# Patient Record
Sex: FEMALE | Race: BLACK OR AFRICAN AMERICAN | NOT HISPANIC OR LATINO | URBAN - METROPOLITAN AREA
[De-identification: names, ages, dates, MRNs, and addresses within clinical notes are randomized per-mention and may not be internally consistent; named-entity substitution may affect disease eponyms.]

---

## 2019-05-06 ENCOUNTER — APPOINTMENT (RX ONLY)
Dept: URBAN - METROPOLITAN AREA CLINIC 45 | Facility: CLINIC | Age: 59
Setting detail: DERMATOLOGY
End: 2019-05-06

## 2019-05-06 ENCOUNTER — APPOINTMENT (RX ONLY)
Dept: URBAN - METROPOLITAN AREA CLINIC 44 | Facility: CLINIC | Age: 59
Setting detail: DERMATOLOGY
End: 2019-05-06

## 2019-05-06 DIAGNOSIS — T07XXXA INSECT BITE, NONVENOMOUS, OF OTHER, MULTIPLE, AND UNSPECIFIED SITES, WITHOUT MENTION OF INFECTION: ICD-10-CM

## 2019-05-06 DIAGNOSIS — L50.3 DERMATOGRAPHIC URTICARIA: ICD-10-CM

## 2019-05-06 DIAGNOSIS — L30.9 DERMATITIS, UNSPECIFIED: ICD-10-CM

## 2019-05-06 PROBLEM — J30.1 ALLERGIC RHINITIS DUE TO POLLEN: Status: ACTIVE | Noted: 2019-05-06

## 2019-05-06 PROBLEM — F41.9 ANXIETY DISORDER, UNSPECIFIED: Status: ACTIVE | Noted: 2019-05-06

## 2019-05-06 PROBLEM — S50.861A INSECT BITE (NONVENOMOUS) OF RIGHT FOREARM, INITIAL ENCOUNTER: Status: ACTIVE | Noted: 2019-05-06

## 2019-05-06 PROBLEM — S40.861A INSECT BITE (NONVENOMOUS) OF RIGHT UPPER ARM, INITIAL ENCOUNTER: Status: ACTIVE | Noted: 2019-05-06

## 2019-05-06 PROBLEM — L70.0 ACNE VULGARIS: Status: ACTIVE | Noted: 2019-05-06

## 2019-05-06 PROBLEM — M12.9 ARTHROPATHY, UNSPECIFIED: Status: ACTIVE | Noted: 2019-05-06

## 2019-05-06 PROCEDURE — ? COUNSELING

## 2019-05-06 PROCEDURE — 11104 PUNCH BX SKIN SINGLE LESION: CPT

## 2019-05-06 PROCEDURE — ? ADDITIONAL NOTES

## 2019-05-06 PROCEDURE — ? BIOPSY BY PUNCH METHOD

## 2019-05-06 PROCEDURE — 99213 OFFICE O/P EST LOW 20 MIN: CPT | Mod: 25

## 2019-05-06 PROCEDURE — ? TREATMENT REGIMEN

## 2019-05-06 ASSESSMENT — LOCATION ZONE DERM
LOCATION ZONE: TRUNK
LOCATION ZONE: TRUNK
LOCATION ZONE: ARM
LOCATION ZONE: NECK
LOCATION ZONE: ARM
LOCATION ZONE: NECK

## 2019-05-06 ASSESSMENT — LOCATION DETAILED DESCRIPTION DERM
LOCATION DETAILED: LEFT MEDIAL TRAPEZIAL NECK
LOCATION DETAILED: RIGHT PROXIMAL DORSAL FOREARM
LOCATION DETAILED: RIGHT PROXIMAL POSTERIOR UPPER ARM
LOCATION DETAILED: RIGHT PROXIMAL DORSAL FOREARM
LOCATION DETAILED: LEFT PROXIMAL RADIAL DORSAL FOREARM
LOCATION DETAILED: LEFT SUPERIOR MEDIAL UPPER BACK
LOCATION DETAILED: LEFT PROXIMAL POSTERIOR UPPER ARM
LOCATION DETAILED: RIGHT PROXIMAL POSTERIOR UPPER ARM
LOCATION DETAILED: LEFT PROXIMAL POSTERIOR UPPER ARM
LOCATION DETAILED: LEFT SUPERIOR MEDIAL UPPER BACK
LOCATION DETAILED: LEFT MEDIAL TRAPEZIAL NECK
LOCATION DETAILED: LEFT PROXIMAL RADIAL DORSAL FOREARM

## 2019-05-06 ASSESSMENT — LOCATION SIMPLE DESCRIPTION DERM
LOCATION SIMPLE: LEFT POSTERIOR UPPER ARM
LOCATION SIMPLE: LEFT POSTERIOR UPPER ARM
LOCATION SIMPLE: LEFT FOREARM
LOCATION SIMPLE: POSTERIOR NECK
LOCATION SIMPLE: RIGHT POSTERIOR UPPER ARM
LOCATION SIMPLE: LEFT UPPER BACK
LOCATION SIMPLE: LEFT FOREARM
LOCATION SIMPLE: LEFT UPPER BACK
LOCATION SIMPLE: POSTERIOR NECK
LOCATION SIMPLE: RIGHT FOREARM
LOCATION SIMPLE: RIGHT FOREARM
LOCATION SIMPLE: RIGHT POSTERIOR UPPER ARM

## 2019-05-06 NOTE — PROCEDURE: TREATMENT REGIMEN
Otc Regimen: Loratadine 10 mg twice daily am \\nDerMend Anti itch lotion\\nBenadryl qhs
Detail Level: Simple
Plan: Advised since there has been no improvement over one year. Advised to consider punch bx for definite dx. Pt agrees on bx.  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  She does take several medications and she discontinued several more recently (Lexapro, Xanax, Ambien) thinking they were possible culprits for her rash.

## 2019-05-06 NOTE — PROCEDURE: BIOPSY BY PUNCH METHOD
Punch Size In Mm: 4
Billing Type: Third-Party Bill
Biopsy Type: H and E
X Depth Of Punch In Cm (Optional): 0
Render Post-Care Instructions In Note?: no
Suture Removal: 14 days
Anesthesia Type: 1% lidocaine without epinephrine
Epidermal Sutures: 4-0 Nylon
Dressing: bandage
Was A Bandage Applied: Yes
Detail Level: Detailed
Wound Care: Petrolatum
Hemostasis: None
Anesthesia Volume In Cc: 0.5

## 2019-05-06 NOTE — PROCEDURE: ADDITIONAL NOTES
Additional Notes: I suspect lesions on the right upper extremity are insect bites; patient does report being at a [employer] yesterday and lesions appeared within the last 24 hours.  Reassured patient; monitor for persistent lesions
Detail Level: Simple

## 2019-05-06 NOTE — HPI: RASH
What Type Of Note Output Would You Prefer (Optional)?: Bullet Format
How Severe Is Your Rash?: mild
Is This A New Presentation, Or A Follow-Up?: Rash
Additional History: Patient states she has not used anything topical. Pt also states she went to [facility] and was referred to [facility] for dermatitis. Pt states she has taken hydroxyzine in the past but medication made her dizzy.

## 2019-05-06 NOTE — PROCEDURE: ADDITIONAL NOTES
Detail Level: Simple
Additional Notes: I suspect lesions on the right upper extremity are insect bites; patient does report being at a [employer] yesterday and lesions appeared within the last 24 hours.  Reassured patient; monitor for persistent lesions

## 2019-05-06 NOTE — HPI: RASH
What Type Of Note Output Would You Prefer (Optional)?: Bullet Format
How Severe Is Your Rash?: mild
Is This A New Presentation, Or A Follow-Up?: Rash
Additional History: Patient states she has not used anything topical. Pt also states she went to [facility] and was referred to [facility] for dermatitis. Pt states she has taken hydroxyzine in the past but medication made her dizzy.

## 2019-05-06 NOTE — PROCEDURE: TREATMENT REGIMEN
Detail Level: Simple
Plan: Advised since there has been no improvement over one year. Advised to consider punch bx for definite dx. Pt agrees on bx.  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  She does take several medications and she discontinued several more recently (Lexapro, Xanax, Ambien) thinking they were possible culprits for her rash.
Otc Regimen: Loratadine 10 mg twice daily am \\nDerMend Anti itch lotion\\nBenadryl qhs

## 2019-05-06 NOTE — PROCEDURE: BIOPSY BY PUNCH METHOD
Lab Facility: [id number]
Dressing: bandage
Anesthesia Type: 1% lidocaine without epinephrine
Render Post-Care Instructions In Note?: no
Billing Type: Third-Party Bill
Size Of Lesion In Cm (Optional): 0
Suture Removal: 14 days
Anesthesia Volume In Cc: 0.5
Detail Level: Detailed
Punch Size In Mm: 4
Wound Care: Petrolatum
Epidermal Sutures: 4-0 Nylon
Was A Bandage Applied: Yes
Biopsy Type: H and E
Hemostasis: None
Lab: [id number]

## 2019-05-20 ENCOUNTER — APPOINTMENT (RX ONLY)
Dept: URBAN - METROPOLITAN AREA CLINIC 44 | Facility: CLINIC | Age: 59
Setting detail: DERMATOLOGY
End: 2019-05-20

## 2019-05-20 ENCOUNTER — APPOINTMENT (RX ONLY)
Dept: URBAN - METROPOLITAN AREA CLINIC 45 | Facility: CLINIC | Age: 59
Setting detail: DERMATOLOGY
End: 2019-05-20

## 2019-05-20 DIAGNOSIS — L30.9 DERMATITIS, UNSPECIFIED: ICD-10-CM | Status: UNCHANGED

## 2019-05-20 DIAGNOSIS — L72.8 OTHER FOLLICULAR CYSTS OF THE SKIN AND SUBCUTANEOUS TISSUE: ICD-10-CM

## 2019-05-20 DIAGNOSIS — Z48.02 ENCOUNTER FOR REMOVAL OF SUTURES: ICD-10-CM

## 2019-05-20 PROBLEM — M12.9 ARTHROPATHY, UNSPECIFIED: Status: ACTIVE | Noted: 2019-05-20

## 2019-05-20 PROBLEM — L70.0 ACNE VULGARIS: Status: ACTIVE | Noted: 2019-05-20

## 2019-05-20 PROCEDURE — 99213 OFFICE O/P EST LOW 20 MIN: CPT | Mod: 25

## 2019-05-20 PROCEDURE — ? SUTURE REMOVAL (NO GLOBAL PERIOD)

## 2019-05-20 PROCEDURE — ? PRESCRIPTION

## 2019-05-20 PROCEDURE — ? VENIPUNCTURE

## 2019-05-20 PROCEDURE — ? TREATMENT REGIMEN

## 2019-05-20 PROCEDURE — ? ORDER TESTS

## 2019-05-20 PROCEDURE — 36415 COLL VENOUS BLD VENIPUNCTURE: CPT

## 2019-05-20 PROCEDURE — ? COUNSELING

## 2019-05-20 PROCEDURE — ? KOH PREP

## 2019-05-20 PROCEDURE — 87220 TISSUE EXAM FOR FUNGI: CPT

## 2019-05-20 RX ORDER — IVERMECTIN 3 MG/1
ONE TABLET ORAL
Qty: 6 | Refills: 0 | Status: ERX | COMMUNITY
Start: 2019-05-20

## 2019-05-20 RX ORDER — PERMETHRIN 50 MG/G
THIN LAYER CREAM TOPICAL QHS
Qty: 2 | Refills: 2 | Status: ERX | COMMUNITY
Start: 2019-05-20

## 2019-05-20 RX ORDER — BETAMETHASONE VALERATE 1 MG/ML
THIN LAYER LOTION CUTANEOUS BID
Qty: 1 | Refills: 0 | Status: ERX | COMMUNITY
Start: 2019-05-20

## 2019-05-20 RX ADMIN — PERMETHRIN THIN LAYER: 50 CREAM TOPICAL at 18:13

## 2019-05-20 RX ADMIN — BETAMETHASONE VALERATE THIN LAYER: 1 LOTION CUTANEOUS at 18:11

## 2019-05-20 RX ADMIN — IVERMECTIN ONE: 3 TABLET ORAL at 18:04

## 2019-05-20 ASSESSMENT — LOCATION SIMPLE DESCRIPTION DERM
LOCATION SIMPLE: RIGHT CHEEK
LOCATION SIMPLE: RIGHT UPPER ARM
LOCATION SIMPLE: LEFT UPPER ARM
LOCATION SIMPLE: LEFT FOREARM
LOCATION SIMPLE: RIGHT UPPER ARM
LOCATION SIMPLE: LEFT POSTERIOR UPPER ARM
LOCATION SIMPLE: LEFT FOREARM
LOCATION SIMPLE: LEFT POSTERIOR UPPER ARM
LOCATION SIMPLE: LEFT UPPER ARM
LOCATION SIMPLE: RIGHT CHEEK

## 2019-05-20 ASSESSMENT — LOCATION DETAILED DESCRIPTION DERM
LOCATION DETAILED: LEFT VENTRAL DISTAL FOREARM
LOCATION DETAILED: RIGHT ANTECUBITAL SKIN
LOCATION DETAILED: LEFT PROXIMAL POSTERIOR UPPER ARM
LOCATION DETAILED: RIGHT CENTRAL BUCCAL CHEEK
LOCATION DETAILED: LEFT ANTERIOR PROXIMAL UPPER ARM
LOCATION DETAILED: LEFT PROXIMAL POSTERIOR UPPER ARM
LOCATION DETAILED: LEFT PROXIMAL RADIAL DORSAL FOREARM
LOCATION DETAILED: LEFT PROXIMAL RADIAL DORSAL FOREARM
LOCATION DETAILED: LEFT ANTERIOR PROXIMAL UPPER ARM
LOCATION DETAILED: LEFT VENTRAL DISTAL FOREARM
LOCATION DETAILED: RIGHT CENTRAL BUCCAL CHEEK
LOCATION DETAILED: RIGHT ANTECUBITAL SKIN

## 2019-05-20 ASSESSMENT — LOCATION ZONE DERM
LOCATION ZONE: FACE
LOCATION ZONE: ARM
LOCATION ZONE: ARM
LOCATION ZONE: FACE

## 2019-05-20 NOTE — PROCEDURE: KOH PREP
Koh Procedure Text (Tissue Harvesting Technique): A 15-blade scalpel was used to scrape the skin. The skin scrapings were placed on a glass slide, covered with a coverslip and a KOH solution was applied.
Detail Level: Simple
Showing: negative findings within normal realm
Koh Intro Text (From The.....): A KOH prep was ordered and evaluated from the

## 2019-05-20 NOTE — PROCEDURE: ORDER TESTS
Expected Date Of Service: 05/20/2019
Performing Laboratory: [id number]
Lab Facility: [id number]
Billing Type: Third-Party Bill
Bill For Surgical Tray: no

## 2019-05-20 NOTE — PROCEDURE: TREATMENT REGIMEN
Initiate Treatment: Permethrin 5% apply thin layer at bedtime from neck down. Leave on for 10-12 hrs and was off in AM. Repeat in one week. (Sent medication for  as well) \\nBetamethsone 0.1% lotion twice daily \\nIvermectin 3mg six tablets at once. Do not repeat.
Plan: Biopsy shows dermal hypersensitivity.  The scattered distribution of lesions (in groups of 2-4) does appear suspicious for insect bite reaction.  Patient reports that she has checked her bedding/mattress for bed bugs.  I recommended that she consider an extermination for a more detailed evaluation.  Although I do not see burrows and scabies prep was negative, scabies is still a possibility.  Will treat with permethrin and patient to have her  treat as well.  Labs ordered today to rule out any obvious underlying medical issues.
Detail Level: Simple
Samples Given: Brochure on Scabies given to patient

## 2019-05-20 NOTE — PROCEDURE: KOH PREP
Detail Level: Simple
Koh Procedure Text (Tissue Harvesting Technique): A 15-blade scalpel was used to scrape the skin. The skin scrapings were placed on a glass slide, covered with a coverslip and a KOH solution was applied.
Showing: negative findings within normal realm
Koh Intro Text (From The.....): A KOH prep was ordered and evaluated from the

## 2019-05-20 NOTE — PROCEDURE: TREATMENT REGIMEN
Samples Given: Brochure on Scabies given to patient
Detail Level: Simple
Plan: Biopsy shows dermal hypersensitivity.  The scattered distribution of lesions (in groups of 2-4) does appear suspicious for insect bite reaction.  Patient reports that she has checked her bedding/mattress for bed bugs.  I recommended that she consider an extermination for a more detailed evaluation.  Although I do not see burrows and scabies prep was negative, scabies is still a possibility.  Will treat with permethrin and patient to have her  treat as well.  Labs ordered today to rule out any obvious underlying medical issues.
Initiate Treatment: Permethrin 5% apply thin layer at bedtime from neck down. Leave on for 10-12 hrs and was off in AM. Repeat in one week. (Sent medication for  as well) \\nBetamethsone 0.1% lotion twice daily \\nIvermectin 3mg six tablets at once. Do not repeat.

## 2022-06-03 ENCOUNTER — OFFICE VISIT (OUTPATIENT)
Dept: URBAN - METROPOLITAN AREA CLINIC 52 | Facility: CLINIC | Age: 62
End: 2022-06-03

## 2022-06-21 ENCOUNTER — OFFICE VISIT (OUTPATIENT)
Dept: URBAN - METROPOLITAN AREA CLINIC 52 | Facility: CLINIC | Age: 62
End: 2022-06-21
Payer: COMMERCIAL

## 2022-06-21 ENCOUNTER — WEB ENCOUNTER (OUTPATIENT)
Dept: URBAN - METROPOLITAN AREA CLINIC 52 | Facility: CLINIC | Age: 62
End: 2022-06-21

## 2022-06-21 ENCOUNTER — OFFICE VISIT (OUTPATIENT)
Dept: URBAN - METROPOLITAN AREA CLINIC 52 | Facility: CLINIC | Age: 62
End: 2022-06-21

## 2022-06-21 ENCOUNTER — LAB OUTSIDE AN ENCOUNTER (OUTPATIENT)
Dept: URBAN - METROPOLITAN AREA CLINIC 52 | Facility: CLINIC | Age: 62
End: 2022-06-21

## 2022-06-21 ENCOUNTER — DASHBOARD ENCOUNTERS (OUTPATIENT)
Age: 62
End: 2022-06-21

## 2022-06-21 VITALS
WEIGHT: 219 LBS | BODY MASS INDEX: 33.19 KG/M2 | TEMPERATURE: 97.9 F | HEIGHT: 68 IN | SYSTOLIC BLOOD PRESSURE: 132 MMHG | HEART RATE: 58 BPM | DIASTOLIC BLOOD PRESSURE: 79 MMHG

## 2022-06-21 DIAGNOSIS — Z86.010 HISTORY OF COLON POLYPS: ICD-10-CM

## 2022-06-21 DIAGNOSIS — Z12.11 SCREEN FOR COLON CANCER: ICD-10-CM

## 2022-06-21 PROCEDURE — 99202 OFFICE O/P NEW SF 15 MIN: CPT | Performed by: INTERNAL MEDICINE

## 2022-06-21 RX ORDER — TRAMADOL HYDROCHLORIDE 50 MG/1
1 TABLET AS NEEDED TABLET, FILM COATED ORAL ONCE A DAY
Status: ACTIVE | COMMUNITY

## 2022-06-21 RX ORDER — AMLODIPINE BESYLATE 5 MG/1
1 TABLET TABLET ORAL ONCE A DAY
Status: ACTIVE | COMMUNITY

## 2022-06-21 RX ORDER — MONTELUKAST SODIUM 10 MG/1
1 TABLET TABLET, FILM COATED ORAL ONCE A DAY
Status: ACTIVE | COMMUNITY

## 2022-06-23 PROBLEM — 428283002: Status: ACTIVE | Noted: 2022-06-21

## 2022-07-25 ENCOUNTER — TELEPHONE ENCOUNTER (OUTPATIENT)
Dept: URBAN - METROPOLITAN AREA CLINIC 52 | Facility: CLINIC | Age: 62
End: 2022-07-25

## 2022-08-03 ENCOUNTER — OFFICE VISIT (OUTPATIENT)
Dept: URBAN - METROPOLITAN AREA SURGERY CENTER 17 | Facility: SURGERY CENTER | Age: 62
End: 2022-08-03
Payer: COMMERCIAL

## 2022-08-03 ENCOUNTER — CLAIMS CREATED FROM THE CLAIM WINDOW (OUTPATIENT)
Dept: URBAN - METROPOLITAN AREA CLINIC 4 | Facility: CLINIC | Age: 62
End: 2022-08-03
Payer: COMMERCIAL

## 2022-08-03 DIAGNOSIS — Z86.010 ADENOMAS PERSONAL HISTORY OF COLONIC POLYPS: ICD-10-CM

## 2022-08-03 DIAGNOSIS — D12.2 BENIGN NEOPLASM OF ASCENDING COLON: ICD-10-CM

## 2022-08-03 DIAGNOSIS — D12.2 ADENOMA OF ASCENDING COLON: ICD-10-CM

## 2022-08-03 DIAGNOSIS — Z12.11 COLON CANCER SCREENING: ICD-10-CM

## 2022-08-03 PROCEDURE — G8907 PT DOC NO EVENTS ON DISCHARG: HCPCS | Performed by: INTERNAL MEDICINE

## 2022-08-03 PROCEDURE — 88305 TISSUE EXAM BY PATHOLOGIST: CPT | Performed by: PATHOLOGY

## 2022-08-03 PROCEDURE — 45380 COLONOSCOPY AND BIOPSY: CPT | Performed by: INTERNAL MEDICINE

## 2023-07-25 NOTE — HPI-TODAY'S VISIT:
Wants colonoscopy Feels pressure in rectum. No blood. Occ anal pain.No abd pain Family neg for colon. [id number] "pan".  4 mm tubulovillous adenoma [id number], alysa sigmoid Well , 11-14 Years Old  Well-child exams are visits with a health care provider to track your child's growth and development at certain ages. The following information tells you what to expect during this visit and gives you some helpful tips about caring for your child.  What immunizations does my child need?  Human papillomavirus (HPV) vaccine.  Influenza vaccine, also called a flu shot. A yearly (annual) flu shot is recommended.  Meningococcal conjugate vaccine.  Tetanus and diphtheria toxoids and acellular pertussis (Tdap) vaccine.  Other vaccines may be suggested to catch up on any missed vaccines or if your child has certain high-risk conditions.  For more information about vaccines, talk to your child's health care provider or go to the Centers for Disease Control and Prevention website for immunization schedules: www.cdc.gov/vaccines/schedules  What tests does my child need?  Physical exam  Your child's health care provider may speak privately with your child without a caregiver for at least part of the exam. This can help your child feel more comfortable discussing:  Sexual behavior.  Substance use.  Risky behaviors.  Depression.  If any of these areas raises a concern, the health care provider may do more tests to make a diagnosis.  Vision  Have your child's vision checked every 2 years if he or she does not have symptoms of vision problems. Finding and treating eye problems early is important for your child's learning and development.  If an eye problem is found, your child may need to have an eye exam every year instead of every 2 years. Your child may also:  Be prescribed glasses.  Have more tests done.  Need to visit an eye specialist.  If your child is sexually active:  Your child may be screened for:  Chlamydia.  Gonorrhea and pregnancy, for females.  HIV.  Other sexually transmitted infections (STIs).  If your child is female:  Your child's health care provider may ask:  If she has begun  menstruating.  The start date of her last menstrual cycle.  The typical length of her menstrual cycle.  Other tests    Your child's health care provider may screen for vision and hearing problems annually. Your child's vision should be screened at least once between 11 and 14 years of age.  Cholesterol and blood sugar (glucose) screening is recommended for all children 9-11 years old.  Have your child's blood pressure checked at least once a year.  Your child's body mass index (BMI) will be measured to screen for obesity.  Depending on your child's risk factors, the health care provider may screen for:  Low red blood cell count (anemia).  Hepatitis B.  Lead poisoning.  Tuberculosis (TB).  Alcohol and drug use.  Depression or anxiety.  Caring for your child  Parenting tips  Stay involved in your child's life. Talk to your child or teenager about:  Bullying. Tell your child to let you know if he or she is bullied or feels unsafe.  Handling conflict without physical violence. Teach your child that everyone gets angry and that talking is the best way to handle anger. Make sure your child knows to stay calm and to try to understand the feelings of others.  Sex, STIs, birth control (contraception), and the choice to not have sex (abstinence). Discuss your views about dating and sexuality.  Physical development, the changes of puberty, and how these changes occur at different times in different people.  Body image. Eating disorders may be noted at this time.  Sadness. Tell your child that everyone feels sad some of the time and that life has ups and downs. Make sure your child knows to tell you if he or she feels sad a lot.  Be consistent and fair with discipline. Set clear behavioral boundaries and limits. Discuss a curfew with your child.  Note any mood disturbances, depression, anxiety, alcohol use, or attention problems. Talk with your child's health care provider if you or your child has concerns about mental  illness.  Watch for any sudden changes in your child's peer group, interest in school or social activities, and performance in school or sports. If you notice any sudden changes, talk with your child right away to figure out what is happening and how you can help.  Oral health    Check your child's toothbrushing and encourage regular flossing.  Schedule dental visits twice a year. Ask your child's dental care provider if your child may need:  Sealants on his or her permanent teeth.  Treatment to correct his or her bite or to straighten his or her teeth.  Give fluoride supplements as told by your child's health care provider.  Skin care  If you or your child is concerned about any acne that develops, contact your child's health care provider.  Sleep  Getting enough sleep is important at this age. Encourage your child to get 9-10 hours of sleep a night. Children and teenagers this age often stay up late and have trouble getting up in the morning.  Discourage your child from watching TV or having screen time before bedtime.  Encourage your child to read before going to bed. This can establish a good habit of calming down before bedtime.  General instructions  Talk with your child's health care provider if you are worried about access to food or housing.  What's next?  Your child should visit a health care provider yearly.  Summary  Your child's health care provider may speak privately with your child without a caregiver for at least part of the exam.  Your child's health care provider may screen for vision and hearing problems annually. Your child's vision should be screened at least once between 11 and 14 years of age.  Getting enough sleep is important at this age. Encourage your child to get 9-10 hours of sleep a night.  If you or your child is concerned about any acne that develops, contact your child's health care provider.  Be consistent and fair with discipline, and set clear behavioral boundaries and limits.  Discuss curfew with your child.  This information is not intended to replace advice given to you by your health care provider. Make sure you discuss any questions you have with your health care provider.  Document Revised: 12/19/2022 Document Reviewed: 12/19/2022  [employer] Patient Education © 2023 [employer]